# Patient Record
Sex: FEMALE | Race: BLACK OR AFRICAN AMERICAN | NOT HISPANIC OR LATINO | Employment: FULL TIME | URBAN - METROPOLITAN AREA
[De-identification: names, ages, dates, MRNs, and addresses within clinical notes are randomized per-mention and may not be internally consistent; named-entity substitution may affect disease eponyms.]

---

## 2018-03-15 ENCOUNTER — HISTORICAL (OUTPATIENT)
Dept: LAB | Facility: HOSPITAL | Age: 43
End: 2018-03-15

## 2021-11-09 ENCOUNTER — HISTORICAL (OUTPATIENT)
Dept: ADMINISTRATIVE | Facility: HOSPITAL | Age: 46
End: 2021-11-09

## 2021-11-09 LAB
BUN SERPL-MCNC: 39.5 MG/DL (ref 7–18.7)
CALCIUM SERPL-MCNC: 11.2 MG/DL (ref 8.7–10.5)
CHLORIDE SERPL-SCNC: 102 MMOL/L (ref 98–107)
CO2 SERPL-SCNC: 29 MMOL/L (ref 22–29)
CREAT SERPL-MCNC: 1.53 MG/DL (ref 0.55–1.02)
CREAT/UREA NIT SERPL: 26
GLUCOSE SERPL-MCNC: 102 MG/DL (ref 74–100)
POTASSIUM SERPL-SCNC: 4.6 MMOL/L (ref 3.5–5.1)
SODIUM SERPL-SCNC: 140 MMOL/L (ref 136–145)

## 2022-04-10 ENCOUNTER — HISTORICAL (OUTPATIENT)
Dept: ADMINISTRATIVE | Facility: HOSPITAL | Age: 47
End: 2022-04-10

## 2022-04-24 VITALS
HEIGHT: 59 IN | DIASTOLIC BLOOD PRESSURE: 90 MMHG | SYSTOLIC BLOOD PRESSURE: 150 MMHG | BODY MASS INDEX: 46.16 KG/M2 | WEIGHT: 229 LBS

## 2023-08-02 ENCOUNTER — HOSPITAL ENCOUNTER (EMERGENCY)
Facility: HOSPITAL | Age: 48
Discharge: HOME | End: 2023-08-02
Attending: STUDENT IN AN ORGANIZED HEALTH CARE EDUCATION/TRAINING PROGRAM

## 2023-08-02 VITALS
WEIGHT: 220 LBS | TEMPERATURE: 99 F | BODY MASS INDEX: 44.35 KG/M2 | HEART RATE: 78 BPM | OXYGEN SATURATION: 99 % | SYSTOLIC BLOOD PRESSURE: 178 MMHG | RESPIRATION RATE: 20 BRPM | HEIGHT: 59 IN | DIASTOLIC BLOOD PRESSURE: 92 MMHG

## 2023-08-02 DIAGNOSIS — S93.401A SPRAIN OF RIGHT ANKLE, UNSPECIFIED LIGAMENT, INITIAL ENCOUNTER: Primary | ICD-10-CM

## 2023-08-02 DIAGNOSIS — M25.579 ANKLE PAIN: ICD-10-CM

## 2023-08-02 PROCEDURE — 99283 EMERGENCY DEPT VISIT LOW MDM: CPT

## 2023-08-02 RX ORDER — HYDROCHLOROTHIAZIDE 25 MG/1
25 TABLET ORAL
COMMUNITY
Start: 2023-07-17

## 2023-08-02 RX ORDER — TRAMADOL HYDROCHLORIDE 50 MG/1
50 TABLET ORAL EVERY 6 HOURS PRN
Qty: 12 TABLET | Refills: 0 | Status: SHIPPED | OUTPATIENT
Start: 2023-08-02

## 2023-08-02 RX ORDER — CARVEDILOL 25 MG/1
25 TABLET ORAL 2 TIMES DAILY
COMMUNITY
Start: 2023-07-17

## 2023-08-02 RX ORDER — LOSARTAN POTASSIUM 50 MG/1
100 TABLET ORAL
COMMUNITY
Start: 2023-07-17

## 2023-08-02 NOTE — Clinical Note
"Estrella Wright" Tomas was seen and treated in our emergency department on 8/2/2023.  She may return to work on 08/05/2023.       If you have any questions or concerns, please don't hesitate to call.      Analy BALDERAS    "

## 2023-08-02 NOTE — ED TRIAGE NOTES
Pt complaint of pain to right inner ankle at getting out of a van and felt a twisting motion 8/2/23 at 10:30. Pt complaint of painful ambulation with some swelling to right ankle

## 2023-08-03 NOTE — DISCHARGE INSTRUCTIONS
Thanks for letting us take care of you today!  It is our goal to give you courteous care and to keep you comfortable and informed, if you have any questions before you leave I will be happy to try and answer them.    Here is some advice after your visit:      Your visit in the emergency department is NOT definitive care - please follow-up with your primary care doctor and/or specialist within 1 week.  Please return if you have any worsening in your condition or if you have any other concerns.    If you had radiology exams like an XRAY or CT in the emergency Department the interpreation on them may be preliminary - there may be less time sensitive findings on the reports please obtain these reports within 24 hours from the hospital or by using your out on your mobile phone to access records.  Bring these to your primary care doctor and/or specialist for further review of incidental findings.    You have been prescribed Tramadol. Please do not take this medication while working, drinking alcohol, swimming, or while driving/operating heavy machinery. This medication may cause drowsiness, dizziness, impair judgment, and reduce physical capabilities.You should not drive, operate heavy machinery, or make life changing decisions while taking this medication.

## 2023-08-03 NOTE — ED PROVIDER NOTES
Encounter Date: 8/2/2023       History     Chief Complaint   Patient presents with    Ankle Pain     Pt complaint of pain to right inner ankle at getting out of a van and felt a twisting motion 8/2/23 at 10:30. Pt complaint of painful ambulation with some swelling to right ankle     47 y.o.  female with a history of hypertension presents to Emergency Department with a chief complaint of R ankle pain. Symptoms began on today after stepping out of van and twisting her ankle and have been constant since onset. Associated symptoms include joint swelling. Symptoms are aggravated with exertion and there are no alleviating factors. The patient denies CP, SOB, fever, chills, or abdominal pain. No other reported symptoms at this time      The history is provided by the patient. No  was used.   Ankle Pain  This is a new problem. The current episode started 6 to 12 hours ago. The problem occurs constantly. The problem has not changed since onset.Pertinent negatives include no chest pain, no abdominal pain, no headaches and no shortness of breath. The symptoms are aggravated by exertion. She has tried nothing for the symptoms.     Review of patient's allergies indicates:  No Known Allergies  Past Medical History:   Diagnosis Date    HTN (hypertension)      History reviewed. No pertinent surgical history.  No family history on file.     Review of Systems   Constitutional:  Negative for chills, fatigue and fever.   Eyes:  Negative for photophobia and visual disturbance.   Respiratory:  Negative for cough, shortness of breath, wheezing and stridor.    Cardiovascular:  Negative for chest pain, palpitations and leg swelling.   Gastrointestinal:  Negative for abdominal pain, nausea and vomiting.   Musculoskeletal:  Positive for arthralgias and joint swelling. Negative for back pain and gait problem.   Skin:  Negative for color change and wound.   Neurological:  Negative for dizziness, syncope,  weakness and headaches.   All other systems reviewed and are negative.      Physical Exam     Initial Vitals [08/02/23 1740]   BP Pulse Resp Temp SpO2   (!) 187/100 70 18 98.6 °F (37 °C) 100 %      MAP       --         Physical Exam    Nursing note and vitals reviewed.  Constitutional: She appears well-developed and well-nourished. She is not diaphoretic. She is cooperative.  Non-toxic appearance. No distress.   HENT:   Head: Normocephalic and atraumatic.   Right Ear: External ear normal.   Left Ear: External ear normal.   Nose: Nose normal.   Mouth/Throat: Oropharynx is clear and moist.   Eyes: Conjunctivae and EOM are normal. Pupils are equal, round, and reactive to light.   Neck: Neck supple.   Normal range of motion.  Cardiovascular:  Normal rate, regular rhythm, S1 normal, S2 normal, normal heart sounds, intact distal pulses and normal pulses.           Pulmonary/Chest: Effort normal and breath sounds normal. No respiratory distress. She has no wheezes. She exhibits no tenderness.   Abdominal: Abdomen is soft. Bowel sounds are normal. She exhibits no distension. There is no abdominal tenderness. There is no guarding.   Musculoskeletal:         General: Tenderness present. Normal range of motion.      Cervical back: Normal range of motion and neck supple.      Right ankle: Swelling present. Tenderness present. No medial malleolus tenderness. Normal pulse.      Left ankle: Normal.      Comments: Tenderness and moderate swelling noted to R ankle. Full 5/5 ROM noted. CMS intact. All other adjacent joints otherwise normal.        Neurological: She is alert and oriented to person, place, and time. She has normal strength. No sensory deficit. GCS score is 15. GCS eye subscore is 4. GCS verbal subscore is 5. GCS motor subscore is 6.   Skin: Skin is warm and dry. Capillary refill takes less than 2 seconds. No rash noted. No erythema.   Psychiatric: She has a normal mood and affect. Thought content normal.         ED  Course   Procedures  Labs Reviewed - No data to display       Imaging Results              X-Ray Ankle Complete Right (Final result)  Result time 08/02/23 18:24:26      Final result by Addie Alexandre MD (08/02/23 18:24:26)                   Impression:      Degenerative change at the ankle.      Electronically signed by: Addie Alexandre  Date:    08/02/2023  Time:    18:24               Narrative:    EXAMINATION:  XR ANKLE COMPLETE 3 VIEW RIGHT    CLINICAL HISTORY:  Pain in unspecified ankle and joints of unspecified foot    TECHNIQUE:  AP, lateral, and oblique images of the right ankle were performed.    COMPARISON:  None.    FINDINGS:  No fracture. No dislocation. Ankle mortise is normal. Degenerative change at the ankle.  Plantar calcaneal enthesophyte.    Tissues nonfocal soft tissue swelling.                                       Medications - No data to display  Medical Decision Making:   Initial Assessment:   Patient awake, alert, has non-labored breathing, and follows commands appropriately. C/o R ankle pain and swelling that began on today after stepping out of van and twisting ankle. NAD noted.   Differential Diagnosis:   Ankle Sprain, Ankle Pain  Clinical Tests:   Radiological Study: Ordered and Reviewed  ED Management:  Co-morbidities and/or factors adding to the complexity or risk for the patient?: none  Differential diagnoses: Ankle Sprain, Ankle Pain  Decision to obtain previous or outside records?: I did not review records.   Chart Review (nursing home, outside records, CareEverywhere): none  Review of RX medications/new RX prescribed by me?: Prescribed short course of Tramadol. Cautioned on side effects.   Labs/imaging/other tests obtained/considered (risk/benefits of testing discussed): Xray R ankle.  Labs/tests intepretation:  Ankle- Degenerative change at the ankle. Informed patient of results.   My independent imaging interpretation: none  Treatment/interventions, IV fluids, IV  medications: Ace Wrap applied.   Complex management (IV controlled substances, went to OR, DNR, meds requiring monitoring, transfer, etc)?: none  Workup/treatment affected by social determinants of health?:none   Point of care US done/interpretation: none  Consults/radiologist/EMS/social work/family discussion/alternate history: none  Advanced care planning/end of life discussion: none  Shared decision making: Discussed plan of care and interventions with patient. Agreed to and aware of plan of care. Comfortable being discharged home.   ETOH/smoking/drug cessation discussion: none  Dispo: Patient discharged home. Patient denies new or additional complaints; no further tests indicated at this time. Verbalized understanding of instructions. No emergent or apparent distress noted prior to discharge. To follow up with PCP in 1 week as needed. Strict ER return precautions given.                               Clinical Impression:   Final diagnoses:  [S93.401A] Sprain of right ankle, unspecified ligament, initial encounter (Primary)        ED Disposition Condition    Discharge Stable          ED Prescriptions       Medication Sig Dispense Start Date End Date Auth. Provider    traMADoL (ULTRAM) 50 mg tablet Take 1 tablet (50 mg total) by mouth every 6 (six) hours as needed for Pain. 12 tablet 8/2/2023 -- Meliza Paz NP          Follow-up Information       Follow up With Specialties Details Why Contact Info    Eliza Ortega MD Internal Medicine Call in 1 week If symptoms worsen, As needed [address]  [facility] [address]  [phone]      [facility] - Emergency Dept Emergency Medicine Go to  If symptoms worsen, As needed [address]  [facility] [id number] [phone]             Meliza Paz NP  08/02/23 4341

## 2024-07-03 ENCOUNTER — HOSPITAL ENCOUNTER (EMERGENCY)
Facility: HOSPITAL | Age: 49
Discharge: HOME | End: 2024-07-03
Attending: EMERGENCY MEDICINE
Payer: COMMERCIAL

## 2024-07-03 VITALS
OXYGEN SATURATION: 98 % | HEIGHT: 59 IN | BODY MASS INDEX: 47.98 KG/M2 | TEMPERATURE: 98 F | DIASTOLIC BLOOD PRESSURE: 79 MMHG | RESPIRATION RATE: 18 BRPM | SYSTOLIC BLOOD PRESSURE: 165 MMHG | HEART RATE: 61 BPM | WEIGHT: 238 LBS

## 2024-07-03 DIAGNOSIS — T16.2XXA FOREIGN BODY OF LEFT EAR, INITIAL ENCOUNTER: Primary | ICD-10-CM

## 2024-07-03 PROCEDURE — 99282 EMERGENCY DEPT VISIT SF MDM: CPT | Mod: 25

## 2024-07-03 PROCEDURE — 69200 CLEAR OUTER EAR CANAL: CPT | Mod: LT

## 2024-07-04 NOTE — ED PROVIDER NOTES
Encounter Date: 7/3/2024       History     Chief Complaint   Patient presents with    Foreign Body in Ear     Stuck a piece of cotton in L ear 3 days ago and can't get it out     48yoAAF with left ear foreign body- q tip. Unable to remove.     The history is provided by the patient and the spouse. No  was used.     Review of patient's allergies indicates:  No Known Allergies  Past Medical History:   Diagnosis Date    HTN (hypertension)      No past surgical history on file.  No family history on file.     Review of Systems   Constitutional: Negative.    HENT:  Positive for ear pain (foreign body LEFT ear).    Eyes: Negative.    Respiratory: Negative.     Cardiovascular: Negative.    Gastrointestinal: Negative.    Genitourinary: Negative.    Musculoskeletal: Negative.    Neurological: Negative.        Physical Exam     Initial Vitals [07/03/24 1823]   BP Pulse Resp Temp SpO2   (!) 165/79 61 18 98.4 °F (36.9 °C) 98 %      MAP       --         Physical Exam    Vitals reviewed.  Constitutional: She appears well-developed and well-nourished. No distress.   HENT:   Head: Normocephalic and atraumatic.   Right Ear: No foreign bodies.   Left Ear: A foreign body is present.   Eyes: Conjunctivae, EOM and lids are normal. Pupils are equal, round, and reactive to light.   Neck: Neck supple.   Normal range of motion.  Cardiovascular:  Normal rate and regular rhythm.           Pulmonary/Chest: Effort normal.   Abdominal: Abdomen is flat.   Musculoskeletal:      Cervical back: Normal range of motion and neck supple.     Neurological: She is alert and oriented to person, place, and time. She has normal strength. She is not disoriented. No cranial nerve deficit or sensory deficit. GCS eye subscore is 4. GCS verbal subscore is 5. GCS motor subscore is 6.   Skin: Skin is warm and intact.   Psychiatric: She has a normal mood and affect. Her speech is normal and behavior is normal. Judgment and thought content normal.  Cognition and memory are normal.         ED Course   Foreign Body    Date/Time: 7/3/2024 7:35 PM    Performed by: Ken Keita MD  Authorized by: Ken Keita MD  Body area: ear  Location details: left ear  Localization method: ENT speculum and visualized  Removal mechanism: alligator forceps and irrigation  Post-procedure assessment: foreign body removed      Labs Reviewed - No data to display       Imaging Results    None          Medications - No data to display  Medical Decision Making  Successful removal with alligator forceps. TM intact.     Problems Addressed:  Foreign body of left ear, initial encounter: acute illness or injury    Risk  OTC drugs.                                      Clinical Impression:  Final diagnoses:  [T16.2XXA] Foreign body of left ear, initial encounter (Primary)          ED Disposition Condition    Discharge Stable          ED Prescriptions    None       Follow-up Information       Follow up With Specialties Details Why Contact Info    Eliza Ortega MD Internal Medicine  If symptoms worsen, As needed [address]  [facility] [address]  [phone]      [facility] - Emergency Dept Emergency Medicine  As needed, If symptoms worsen [address]  [facility] [id number] [phone]             Rebecca Perez PA  07/03/24 2044

## 2025-03-08 ENCOUNTER — HOSPITAL ENCOUNTER (EMERGENCY)
Facility: HOSPITAL | Age: 50
Discharge: HOME | End: 2025-03-08
Attending: STUDENT IN AN ORGANIZED HEALTH CARE EDUCATION/TRAINING PROGRAM
Payer: COMMERCIAL

## 2025-03-08 VITALS
TEMPERATURE: 97 F | BODY MASS INDEX: 57.45 KG/M2 | SYSTOLIC BLOOD PRESSURE: 150 MMHG | WEIGHT: 285 LBS | HEIGHT: 59 IN | DIASTOLIC BLOOD PRESSURE: 72 MMHG | HEART RATE: 67 BPM | OXYGEN SATURATION: 96 % | RESPIRATION RATE: 18 BRPM
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
## 2025-03-08 DIAGNOSIS — S16.1XXA CERVICAL STRAIN, ACUTE, INITIAL ENCOUNTER: ICD-10-CM

## 2025-03-08 PROCEDURE — 99284 EMERGENCY DEPT VISIT MOD MDM: CPT | Mod: 25

## 2025-03-08 PROCEDURE — 25000003 PHARM REV CODE 250

## 2025-03-08 RX ORDER — METHOCARBAMOL 500 MG/1
500 TABLET, FILM COATED ORAL 3 TIMES DAILY
Qty: 15 TABLET | Refills: 0 | Status: SHIPPED | OUTPATIENT
Start: 2025-03-08 | End: 2025-03-13

## 2025-03-08 RX ORDER — ACETAMINOPHEN 500 MG
1000 TABLET ORAL
Status: COMPLETED | OUTPATIENT
Start: 2025-03-08 | End: 2025-03-08

## 2025-03-08 RX ORDER — CARVEDILOL 25 MG/1
25 TABLET ORAL 2 TIMES DAILY WITH MEALS
Status: DISCONTINUED | OUTPATIENT
Start: 2025-03-08 | End: 2025-03-09 | Stop reason: HOSPADM

## 2025-03-08 RX ORDER — LOSARTAN POTASSIUM 50 MG/1
50 TABLET ORAL
Status: COMPLETED | OUTPATIENT
Start: 2025-03-08 | End: 2025-03-08

## 2025-03-08 RX ORDER — HYDROCHLOROTHIAZIDE 25 MG/1
25 TABLET ORAL
Status: COMPLETED | OUTPATIENT
Start: 2025-03-08 | End: 2025-03-08

## 2025-03-08 RX ADMIN — CARVEDILOL 25 MG: 25 TABLET, FILM COATED ORAL at 08:03

## 2025-03-08 RX ADMIN — LOSARTAN POTASSIUM 50 MG: 50 TABLET, FILM COATED ORAL at 08:03

## 2025-03-08 RX ADMIN — ACETAMINOPHEN 1000 MG: 500 TABLET ORAL at 08:03

## 2025-03-08 RX ADMIN — HYDROCHLOROTHIAZIDE 25 MG: 25 TABLET ORAL at 08:03
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
## 2025-03-20 ENCOUNTER — HOSPITAL ENCOUNTER (EMERGENCY)
Facility: HOSPITAL | Age: 50
Discharge: HOME | End: 2025-03-20
Attending: EMERGENCY MEDICINE
Payer: COMMERCIAL

## 2025-03-20 VITALS
OXYGEN SATURATION: 95 % | HEART RATE: 66 BPM | RESPIRATION RATE: 18 BRPM | BODY MASS INDEX: 55.44 KG/M2 | HEIGHT: 59 IN | SYSTOLIC BLOOD PRESSURE: 184 MMHG | TEMPERATURE: 99 F | WEIGHT: 275 LBS | DIASTOLIC BLOOD PRESSURE: 96 MMHG

## 2025-03-20 DIAGNOSIS — S90.01XA CONTUSION OF RIGHT ANKLE, INITIAL ENCOUNTER: Primary | ICD-10-CM

## 2025-03-20 DIAGNOSIS — R52 PAIN: ICD-10-CM

## 2025-03-20 PROCEDURE — 99283 EMERGENCY DEPT VISIT LOW MDM: CPT | Mod: 25

## 2025-03-20 RX ORDER — ROSUVASTATIN CALCIUM 20 MG/1
TABLET, COATED ORAL
COMMUNITY

## 2025-03-20 RX ORDER — FUROSEMIDE 20 MG/1
20 TABLET ORAL
COMMUNITY
Start: 2024-12-05

## 2025-03-20 RX ORDER — ASPIRIN 81 MG/1
1 TABLET ORAL DAILY
COMMUNITY

## 2025-03-20 RX ORDER — DICLOFENAC SODIUM 75 MG/1
75 TABLET, DELAYED RELEASE ORAL 2 TIMES DAILY
Qty: 28 TABLET | Refills: 0 | Status: SHIPPED | OUTPATIENT
Start: 2025-03-20 | End: 2025-04-03

## 2025-03-21 NOTE — ED PROVIDER NOTES
Encounter Date: 3/20/2025       History     Chief Complaint   Patient presents with    Ankle Pain     C/o right ankle pain. Was hit posterior ankle at work.      The history is provided by the patient.   Ankle Pain  This is a new problem. The current episode started 3 to 5 hours ago. The problem occurs constantly. The problem has not changed since onset.Pertinent negatives include no chest pain and no shortness of breath. The symptoms are aggravated by walking and standing. Nothing relieves the symptoms.   Works at [employer] and co-worker inadvertently struck her right heel area with a floor kamille.    Review of patient's allergies indicates:  No Known Allergies  Past Medical History:   Diagnosis Date    HTN (hypertension)      History reviewed. No pertinent surgical history.  No family history on file.  Social History[1]  Review of Systems   Constitutional:  Negative for fever.   HENT:  Negative for sore throat.    Respiratory:  Negative for shortness of breath.    Cardiovascular:  Negative for chest pain.   Gastrointestinal:  Negative for nausea.   Genitourinary:  Negative for dysuria.   Musculoskeletal:  Negative for back pain.   Skin:  Negative for rash.   Neurological:  Negative for weakness.   Hematological:  Does not bruise/bleed easily.       Physical Exam     Initial Vitals [03/20/25 1900]   BP Pulse Resp Temp SpO2   (!) 234/110 69 16 98.8 °F (37.1 °C) 95 %      MAP       --         Physical Exam    Nursing note and vitals reviewed.  Constitutional: She appears well-developed and well-nourished.   HENT:   Head: Normocephalic and atraumatic.   Right Ear: External ear normal.   Left Ear: External ear normal.   Eyes: Conjunctivae and EOM are normal. Pupils are equal, round, and reactive to light.   Neck: Neck supple.   Normal range of motion.  Cardiovascular:  Normal rate, regular rhythm, normal heart sounds and intact distal pulses.           Pulmonary/Chest: Breath sounds normal.   Abdominal: Abdomen is soft.  Bowel sounds are normal.   Morbidly obese   Musculoskeletal:         General: Normal range of motion.      Cervical back: Normal range of motion and neck supple.        Feet:       Comments: Negative Gonzalez test     Neurological: She is alert and oriented to person, place, and time. GCS score is 15. GCS eye subscore is 4. GCS verbal subscore is 5. GCS motor subscore is 6.   Skin: Skin is warm and dry. Capillary refill takes less than 2 seconds.   Psychiatric: She has a normal mood and affect. Her behavior is normal. Judgment and thought content normal.         ED Course   Procedures  Labs Reviewed - No data to display       Imaging Results              X-Ray Ankle Complete Right (Preliminary result)  Result time 03/20/25 19:43:11      Wet Read by Panda Smith MD (03/20/25 19:43:11, [facility] - Emergency Dept, Emergency Medicine)    Degenerative changes, no fracture or dislocation                                     Medications - No data to display  Medical Decision Making  Amount and/or Complexity of Data Reviewed  Radiology: ordered and independent interpretation performed. Decision-making details documented in ED Course.    Risk  Prescription drug management.    Differential includes:  sprain, strain, contusion, fracture.  Will obtain ankle x-rays.                                  Clinical Impression:  Final diagnoses:  [R52] Pain  [S90.01XA] Contusion of right ankle, initial encounter (Primary)          ED Disposition Condition    Discharge Stable          ED Prescriptions       Medication Sig Dispense Start Date End Date Auth. Provider    diclofenac (VOLTAREN) 75 MG EC tablet Take 1 tablet (75 mg total) by mouth 2 (two) times daily. for 14 days 28 tablet 3/20/2025 4/3/2025 Panda Smith MD          Follow-up Information       Follow up With Specialties Details Why Contact Info    Eliza Ortega MD Internal Medicine Schedule an appointment as soon as possible for  a visit   [address] [place], [place] [address].  [facility] [id number]  [phone]                 [1]   Social History  Tobacco Use    Smoking status: Never    Smokeless tobacco: Never   Substance Use Topics    Alcohol use: Never    Drug use: Never        Panda Smith MD  03/20/25 1950

## 2025-05-20 ENCOUNTER — HOSPITAL ENCOUNTER (EMERGENCY)
Facility: HOSPITAL | Age: 50
Discharge: HOME | End: 2025-05-20
Attending: EMERGENCY MEDICINE
Payer: COMMERCIAL

## 2025-05-20 VITALS
OXYGEN SATURATION: 98 % | DIASTOLIC BLOOD PRESSURE: 78 MMHG | RESPIRATION RATE: 16 BRPM | TEMPERATURE: 98 F | BODY MASS INDEX: 57.45 KG/M2 | SYSTOLIC BLOOD PRESSURE: 179 MMHG | HEART RATE: 76 BPM | HEIGHT: 59 IN | WEIGHT: 285 LBS

## 2025-05-20 DIAGNOSIS — M25.571 RIGHT ANKLE PAIN, UNSPECIFIED CHRONICITY: Primary | ICD-10-CM

## 2025-05-20 DIAGNOSIS — S99.911D INJURY OF RIGHT ANKLE, SUBSEQUENT ENCOUNTER: ICD-10-CM

## 2025-05-20 PROCEDURE — 99283 EMERGENCY DEPT VISIT LOW MDM: CPT

## 2025-05-20 PROCEDURE — 25000003 PHARM REV CODE 250

## 2025-05-20 RX ORDER — CLONIDINE HYDROCHLORIDE 0.1 MG/1
TABLET ORAL
Status: DISPENSED
Start: 2025-05-20 | End: 2025-05-21

## 2025-05-20 RX ORDER — CARVEDILOL 25 MG/1
25 TABLET ORAL
COMMUNITY

## 2025-05-20 RX ORDER — ONDANSETRON 4 MG/1
TABLET, ORALLY DISINTEGRATING ORAL
Status: DISPENSED
Start: 2025-05-20 | End: 2025-05-21

## 2025-05-20 RX ORDER — HYDROCODONE BITARTRATE AND ACETAMINOPHEN 5; 325 MG/1; MG/1
1 TABLET ORAL
Refills: 0 | Status: COMPLETED | OUTPATIENT
Start: 2025-05-20 | End: 2025-05-20

## 2025-05-20 RX ORDER — TRAMADOL HYDROCHLORIDE 50 MG/1
50 TABLET, FILM COATED ORAL EVERY 8 HOURS PRN
Qty: 10 TABLET | Refills: 0 | Status: SHIPPED | OUTPATIENT
Start: 2025-05-20

## 2025-05-20 RX ORDER — DULOXETIN HYDROCHLORIDE 60 MG/1
1 CAPSULE, DELAYED RELEASE ORAL EVERY MORNING
COMMUNITY

## 2025-05-20 RX ORDER — ONDANSETRON 4 MG/1
4 TABLET, ORALLY DISINTEGRATING ORAL
Status: COMPLETED | OUTPATIENT
Start: 2025-05-20 | End: 2025-05-20

## 2025-05-20 RX ORDER — HYDROCODONE BITARTRATE AND ACETAMINOPHEN 5; 325 MG/1; MG/1
TABLET ORAL
Status: DISPENSED
Start: 2025-05-20 | End: 2025-05-21

## 2025-05-20 RX ORDER — CLONIDINE HYDROCHLORIDE 0.1 MG/1
0.1 TABLET ORAL
Status: COMPLETED | OUTPATIENT
Start: 2025-05-20 | End: 2025-05-20

## 2025-05-20 RX ADMIN — ONDANSETRON 4 MG: 4 TABLET, ORALLY DISINTEGRATING ORAL at 09:05

## 2025-05-20 RX ADMIN — HYDROCODONE BITARTRATE AND ACETAMINOPHEN 1 TABLET: 5; 325 TABLET ORAL at 09:05

## 2025-05-20 RX ADMIN — CLONIDINE HYDROCHLORIDE 0.1 MG: 0.1 TABLET ORAL at 09:05

## 2025-05-20 NOTE — Clinical Note
"Estrella Wright" Tomas was seen and treated in our emergency department on 5/20/2025.  She may return to work on 05/23/2025.       If you have any questions or concerns, please don't hesitate to call.      Victorina Kiran PA-C"

## 2025-05-21 NOTE — DISCHARGE INSTRUCTIONS
Take over-the-counter ibuprofen or Tylenol as needed for pain.  Elevate your foot and use ice to help with the pain.  See attached instructions for further information.    See your family doctor in one to 2 days for further evaluation, workup, and treatment as necessary    Avoid driving or operating machinery while taking medicines as some medicines might cause drowsiness and may cause problems. Also pain medicines have potential of being addictive  so use Pain meds specially Narcotics Sparingly.    The exam and treatment you received in Emergency Room was for an urgent problem and NOT INTENDED AS COMPLETE CARE. It is important that you FOLLOW UP with a doctor for ongoing care. If your symptoms become WORSE or you DO NOT IMPROVE and you are unable to reach your health care provider, you should RETURN to the emergency department. The Emergency Room doctor has provided a PRELIMINARY INTERPRETATION of all your STUDIES. A final interpretation may be done after you are discharged. IF A CHANGE in your diagnosis or treatment is needed WE WILL CONTACT YOU. It is critical that we have a CURRENT PHONE NUMBER FOR YOU.

## 2025-05-21 NOTE — ED PROVIDER NOTES
Encounter Date: 5/20/2025       History     Chief Complaint   Patient presents with    Ankle Injury     Pt having chronic r ankle pain s/p being hit by a forklift 2 months ago.     See MDM    The history is provided by the patient. No  was used.     Review of patient's allergies indicates:  No Known Allergies  Past Medical History:   Diagnosis Date    High cholesterol     HTN (hypertension)      No past surgical history on file.  No family history on file.  Social History[1]  Review of Systems   Constitutional:         Right ankle pain   All other systems reviewed and are negative.      Physical Exam     Initial Vitals [05/20/25 2045]   BP Pulse Resp Temp SpO2   (!) 200/102 71 18 98 °F (36.7 °C) 96 %      MAP       --         Physical Exam    Nursing note and vitals reviewed.  Constitutional: She appears well-developed and well-nourished. She is not diaphoretic. She is Obese . No distress.   Eyes: EOM are normal.   Neck:   Normal range of motion.  Cardiovascular:  Normal rate, regular rhythm and intact distal pulses.           Pulmonary/Chest: Breath sounds normal.   Musculoskeletal:         General: Tenderness present. Normal range of motion.      Cervical back: Normal range of motion.        Feet:       Comments: Patient endorsing TTP to the right posterior ankle as well as bilateral sides.  ROM WNL.  No obvious bruising, erythema, wounds, or other overlying skin changes noted.  Bilateral ankles are noted to be swollen, they are of equal size.  Intact sensation.  Intact pulses.  Less than 2 capillary refill.  Patient is ambulatory. All other adjacent joints otherwise normal     Neurological: She is alert and oriented to person, place, and time. She has normal strength. She displays normal reflexes. No sensory deficit.   Skin: Skin is warm and dry. Capillary refill takes less than 2 seconds.   Psychiatric: She has a normal mood and affect.         ED Course   Procedures  Labs Reviewed - No data  to display       Imaging Results    None          Medications   cloNIDine tablet 0.1 mg (0.1 mg Oral Given 5/20/25 2103)   HYDROcodone-acetaminophen 5-325 mg per tablet 1 tablet (1 tablet Oral Given 5/20/25 2118)   ondansetron disintegrating tablet 4 mg (4 mg Oral Given 5/20/25 2118)     Medical Decision Making  The patient is a 49 y.o. female with a pertinent PMHX of for HTN, HLD, right ankle injury 2 months ago who presents to the Emergency Department with her fiance with a chief complaint of right ankle pain. Symptoms began on 03/20/2025 following an injury at work where 1 of the coworkers inadvertently struck her right heel with a 4 Matt and have been constant since onset. Associated symptoms include nothing. The pain is currently rated as a 10/10 in severity and described as painful with no radiation.  Symptoms are aggravated with standing for prolonged periods of time and alleviated with nothing. The patient denies new trauma, numbness, tingling, erythema, bruises, wounds, changes or worsening of the pain, chest pain, shortness of breath, pain in the calves, inability to ambulate, bleeding disorders, kidney disorders. They report taking ibuprofen, Aleve prior to arrival with no relief of symptoms. No other reported symptoms at this time.  Patient states that she has been unable to get in to see an orthopedic doctor as they are waiting on a claim number from the injury.  Patient states that the reason she came to the ER today is because she was on her feet all day at work and felt like the medications that she was taken was not relieving her symptoms.  She is requesting a few days off of work as well.    Pertinent physical exam findings include Patient endorsing TTP to the right posterior ankle as well as bilateral sides.  ROM WNL.  No obvious bruising, erythema, wounds, or other overlying skin changes noted.  Bilateral ankles are noted to be swollen, they are of equal size.  Intact sensation.  Intact pulses.   "Less than 2 capillary refill.  Patient is ambulatory. All other adjacent joints otherwise normal.  Vital signs on arrival notable for HTN, otherwise stable.  Patient states that she took her blood pressure medications today.  She was noted to be 234/110 blood pressure at her previous ER visit.  Clonidine given.  Medication for pain given.    Discussed repeating XR imaging with the patient.  She states that she does not feel this is necessary at this time as she has had no new injuries or changes in the pain.  She will follow up with her primary care provider.  Patient endorsing improvement in symptoms since being given medication while ER.  Vital signs improved prior to discharge.  Medications sent to pharmacy  Discharge instructions and return precautions provided. Patient verbalized understanding and agreement of plan. All questions answered.    Portions of this note have been created with voice recognition software. Occasional "wrong-words" or "sound alike" substitutions may have occurred due to inherent limitations of voice software. Please read the note carefully and recognize, using context, word substitutions may have occurred.       Amount and/or Complexity of Data Reviewed  External Data Reviewed: radiology and notes.     Details: Previous ER visit on 03/20/2025 reviewed.  XR at that time with no acute fracture or dislocation.  There were some degenerative changes noted.    Risk  Prescription drug management.  Risk Details: Strict ED return precautions provided. I have spoken with the patient and/or caregivers. I have explained the patient's condition, diagnoses and treatment plan based on the information available to me at this time. I have answered the patient's and/or caregiver's questions and addressed any concerns. The patient and/or caregivers have as good an understanding of the patient's diagnosis, condition and treatment plan as can be expected at this point. The patient's condition is stable and " appropriate for discharge from the emergency department.      The patient will pursue further outpatient evaluation with the primary care physician or other designated or consulting physician as outlined in the discharge instructions. The patient and/or caregivers are agreeable to this plan of care and follow-up instructions have been explained in detail. The patient and/or caregivers have received these instructions in written format and have expressed an understanding of the discharge instructions. The patient and/or caregivers are aware that any significant change in condition or worsening of symptoms should prompt an immediate return to this or the closest emergency department or a call to 911.        Additional MDM:   Differential Diagnosis:   Judging by the patient's chief complaint and pertinent history, the patient has the following possible differential diagnoses, including but not limited to the following:  Sprain, strain, contusion, abrasion, chronic pain  Some of these are deemed to be lower likelihood and some more likely based on my physical exam and history combined with possible lab work and/or imaging studies. Please see the pertinent studies, and refer to the HPI. Some of these diagnoses will take further evaluation to fully rule out, perhaps as an outpatient and the patient was encouraged to follow up when discharged for more comprehensive evaluation.                                       Clinical Impression:  Final diagnoses:  [M25.571] Right ankle pain, unspecified chronicity (Primary)  [S99.911D] Injury of right ankle, subsequent encounter          ED Disposition Condition    Discharge Stable          ED Prescriptions       Medication Sig Dispense Start Date End Date Auth. Provider    traMADoL (ULTRAM) 50 mg tablet Take 1 tablet (50 mg total) by mouth every 8 (eight) hours as needed for Pain. 10 tablet 5/20/2025 -- Victorina Kiran PA-C          Follow-up Information       Follow up With  Specialties Details Why Contact Info    Orthopedic doctor  Call in 1 week For follow up     Eliza Ortega MD Internal Medicine Call in 1 day For follow up [address]  [place] [address]  [phone]                     [1]   Social History  Tobacco Use    Smoking status: Never    Smokeless tobacco: Never   Substance Use Topics    Alcohol use: Never    Drug use: Never        Victorina Kiran PA-C  05/20/25 7734

## 2025-06-25 ENCOUNTER — HOSPITAL ENCOUNTER (EMERGENCY)
Facility: HOSPITAL | Age: 50
Discharge: HOME | End: 2025-06-25
Attending: EMERGENCY MEDICINE
Payer: COMMERCIAL

## 2025-06-25 VITALS
HEIGHT: 59 IN | DIASTOLIC BLOOD PRESSURE: 75 MMHG | WEIGHT: 275 LBS | HEART RATE: 65 BPM | OXYGEN SATURATION: 97 % | BODY MASS INDEX: 55.44 KG/M2 | TEMPERATURE: 98 F | SYSTOLIC BLOOD PRESSURE: 155 MMHG | RESPIRATION RATE: 18 BRPM

## 2025-06-25 DIAGNOSIS — R11.0 NAUSEA: ICD-10-CM

## 2025-06-25 DIAGNOSIS — B37.2 CUTANEOUS CANDIDIASIS: ICD-10-CM

## 2025-06-25 DIAGNOSIS — R10.84 DIFFUSE ABDOMINAL PAIN: Primary | ICD-10-CM

## 2025-06-25 LAB
ALBUMIN SERPL-MCNC: 4.5 G/DL (ref 3.5–5)
ALBUMIN/GLOB SERPL: 1.2 RATIO (ref 1.1–2)
ALP SERPL-CCNC: 89 UNIT/L (ref 40–150)
ALT SERPL-CCNC: 28 UNIT/L (ref 0–55)
ANION GAP SERPL CALC-SCNC: 7 MEQ/L
AST SERPL-CCNC: 23 UNIT/L (ref 11–45)
BACTERIA #/AREA URNS AUTO: ABNORMAL /HPF
BASOPHILS # BLD AUTO: 0.02 X10(3)/MCL
BASOPHILS NFR BLD AUTO: 0.2 %
BILIRUB SERPL-MCNC: 0.4 MG/DL
BILIRUB UR QL STRIP.AUTO: NEGATIVE
BUN SERPL-MCNC: 9.5 MG/DL (ref 9.8–20.1)
CALCIUM SERPL-MCNC: 10.5 MG/DL (ref 8.4–10.2)
CHLORIDE SERPL-SCNC: 112 MMOL/L (ref 98–107)
CLARITY UR: CLEAR
CO2 SERPL-SCNC: 23 MMOL/L (ref 22–29)
COLOR UR AUTO: ABNORMAL
CREAT SERPL-MCNC: 0.71 MG/DL (ref 0.55–1.02)
CREAT/UREA NIT SERPL: 13
EOSINOPHIL # BLD AUTO: 0.07 X10(3)/MCL (ref 0–0.9)
EOSINOPHIL NFR BLD AUTO: 0.9 %
ERYTHROCYTE [DISTWIDTH] IN BLOOD BY AUTOMATED COUNT: 13.7 % (ref 11.5–17)
GFR SERPLBLD CREATININE-BSD FMLA CKD-EPI: >60 ML/MIN/1.73/M2
GLOBULIN SER-MCNC: 3.9 GM/DL (ref 2.4–3.5)
GLUCOSE SERPL-MCNC: 88 MG/DL (ref 74–100)
GLUCOSE UR QL STRIP: NEGATIVE
HCT VFR BLD AUTO: 49.4 % (ref 37–47)
HGB BLD-MCNC: 16.2 G/DL (ref 12–16)
HGB UR QL STRIP: ABNORMAL
IMM GRANULOCYTES # BLD AUTO: 0.04 X10(3)/MCL (ref 0–0.04)
IMM GRANULOCYTES NFR BLD AUTO: 0.5 %
KETONES UR QL STRIP: NEGATIVE
LEUKOCYTE ESTERASE UR QL STRIP: NEGATIVE
LIPASE SERPL-CCNC: 38 U/L
LYMPHOCYTES # BLD AUTO: 2.12 X10(3)/MCL (ref 0.6–4.6)
LYMPHOCYTES NFR BLD AUTO: 26.3 %
MCH RBC QN AUTO: 29.4 PG (ref 27–31)
MCHC RBC AUTO-ENTMCNC: 32.8 G/DL (ref 33–36)
MCV RBC AUTO: 89.7 FL (ref 80–94)
MONOCYTES # BLD AUTO: 0.65 X10(3)/MCL (ref 0.1–1.3)
MONOCYTES NFR BLD AUTO: 8.1 %
MUCOUS THREADS URNS QL MICRO: ABNORMAL /LPF
NEUTROPHILS # BLD AUTO: 5.17 X10(3)/MCL (ref 2.1–9.2)
NEUTROPHILS NFR BLD AUTO: 64 %
NITRITE UR QL STRIP: NEGATIVE
NRBC BLD AUTO-RTO: 0 %
PH UR STRIP: 6 [PH]
PLATELET # BLD AUTO: 193 X10(3)/MCL (ref 130–400)
PMV BLD AUTO: 12.2 FL (ref 7.4–10.4)
POTASSIUM SERPL-SCNC: 4.1 MMOL/L (ref 3.5–5.1)
PROT SERPL-MCNC: 8.4 GM/DL (ref 6.4–8.3)
PROT UR QL STRIP: 100
RBC # BLD AUTO: 5.51 X10(6)/MCL (ref 4.2–5.4)
RBC #/AREA URNS AUTO: ABNORMAL /HPF
SODIUM SERPL-SCNC: 142 MMOL/L (ref 136–145)
SP GR UR STRIP.AUTO: >=1.03 (ref 1–1.03)
SQUAMOUS #/AREA URNS AUTO: ABNORMAL /HPF
TROPONIN I SERPL-MCNC: <0.01 NG/ML (ref 0–0.04)
UROBILINOGEN UR STRIP-ACNC: 0.2
WBC # BLD AUTO: 8.07 X10(3)/MCL (ref 4.5–11.5)
WBC #/AREA URNS AUTO: ABNORMAL /HPF

## 2025-06-25 PROCEDURE — 81003 URINALYSIS AUTO W/O SCOPE: CPT | Performed by: EMERGENCY MEDICINE

## 2025-06-25 PROCEDURE — 96374 THER/PROPH/DIAG INJ IV PUSH: CPT

## 2025-06-25 PROCEDURE — 83690 ASSAY OF LIPASE: CPT | Performed by: EMERGENCY MEDICINE

## 2025-06-25 PROCEDURE — 25500020 PHARM REV CODE 255: Performed by: EMERGENCY MEDICINE

## 2025-06-25 PROCEDURE — 99285 EMERGENCY DEPT VISIT HI MDM: CPT | Mod: 25

## 2025-06-25 PROCEDURE — 80053 COMPREHEN METABOLIC PANEL: CPT | Performed by: EMERGENCY MEDICINE

## 2025-06-25 PROCEDURE — 84484 ASSAY OF TROPONIN QUANT: CPT | Performed by: EMERGENCY MEDICINE

## 2025-06-25 PROCEDURE — 96361 HYDRATE IV INFUSION ADD-ON: CPT

## 2025-06-25 PROCEDURE — 63600175 PHARM REV CODE 636 W HCPCS: Performed by: EMERGENCY MEDICINE

## 2025-06-25 PROCEDURE — 85025 COMPLETE CBC W/AUTO DIFF WBC: CPT | Performed by: EMERGENCY MEDICINE

## 2025-06-25 RX ORDER — LABETALOL HYDROCHLORIDE 5 MG/ML
20 INJECTION, SOLUTION INTRAVENOUS
Status: DISCONTINUED | OUTPATIENT
Start: 2025-06-25 | End: 2025-06-25 | Stop reason: HOSPADM

## 2025-06-25 RX ORDER — PANTOPRAZOLE SODIUM 40 MG/1
40 TABLET, DELAYED RELEASE ORAL DAILY
Qty: 30 TABLET | Refills: 2 | Status: SHIPPED | OUTPATIENT
Start: 2025-06-25 | End: 2025-09-23

## 2025-06-25 RX ORDER — KETOROLAC TROMETHAMINE 30 MG/ML
30 INJECTION, SOLUTION INTRAMUSCULAR; INTRAVENOUS
Status: COMPLETED | OUTPATIENT
Start: 2025-06-25 | End: 2025-06-25

## 2025-06-25 RX ORDER — DICYCLOMINE HYDROCHLORIDE 20 MG/1
20 TABLET ORAL 4 TIMES DAILY
Qty: 40 TABLET | Refills: 0 | Status: SHIPPED | OUTPATIENT
Start: 2025-06-25 | End: 2025-07-05

## 2025-06-25 RX ORDER — NYSTATIN 100000 [USP'U]/G
POWDER TOPICAL 2 TIMES DAILY
Qty: 60 G | Refills: 3 | Status: SHIPPED | OUTPATIENT
Start: 2025-06-25

## 2025-06-25 RX ADMIN — IOHEXOL 100 ML: 350 INJECTION, SOLUTION INTRAVENOUS at 12:06

## 2025-06-25 RX ADMIN — KETOROLAC TROMETHAMINE 30 MG: 30 INJECTION, SOLUTION INTRAMUSCULAR; INTRAVENOUS at 12:06

## 2025-06-25 RX ADMIN — SODIUM CHLORIDE, POTASSIUM CHLORIDE, SODIUM LACTATE AND CALCIUM CHLORIDE 1000 ML: 600; 310; 30; 20 INJECTION, SOLUTION INTRAVENOUS at 12:06

## 2025-06-25 NOTE — ED PROVIDER NOTES
Encounter Date: 6/25/2025       History     Chief Complaint   Patient presents with    Weakness     Pt c/o feeling weak onset six days ago, along with abd pain n/v/d.     The history is provided by the patient.   Abdominal Pain  The current episode started several days ago. The abdominal pain is generalized. The abdominal pain is relieved by nothing. The other symptoms of the illness include diarrhea. The other symptoms of the illness do not include fever, shortness of breath, nausea, vomiting or dysuria.   The diarrhea began 3 to 5 days ago. The diarrhea is watery.   Symptoms associated with the illness do not include back pain.   Also complains of generalized weakness.  Symptoms began one week ago but improved over the weekend, but she began feeling bad again 2 days ago.  Did not take her BP medication this AM.    Review of patient's allergies indicates:  No Known Allergies  Past Medical History:   Diagnosis Date    High cholesterol     HTN (hypertension)      No past surgical history on file.  No family history on file.  Social History[1]  Review of Systems   Constitutional:  Negative for fever.   HENT:  Negative for sore throat.    Respiratory:  Negative for shortness of breath.    Cardiovascular:  Negative for chest pain.   Gastrointestinal:  Positive for abdominal pain and diarrhea. Negative for nausea and vomiting.   Genitourinary:  Negative for dysuria.   Musculoskeletal:  Negative for back pain.   Skin:  Negative for rash.   Neurological:  Negative for weakness.   Hematological:  Does not bruise/bleed easily.       Physical Exam     Initial Vitals [06/25/25 1153]   BP Pulse Resp Temp SpO2   (!) 209/110 71 20 98.4 °F (36.9 °C) 98 %      MAP       --         Physical Exam    Nursing note and vitals reviewed.  Constitutional: She appears well-developed and well-nourished.   HENT:   Head: Normocephalic and atraumatic.   Right Ear: External ear normal.   Left Ear: External ear normal.   Eyes: Conjunctivae and  EOM are normal. Pupils are equal, round, and reactive to light.   Neck: Neck supple.   Normal range of motion.  Cardiovascular:  Normal rate, regular rhythm, normal heart sounds and intact distal pulses.           Pulmonary/Chest: Breath sounds normal.   Abdominal: Abdomen is soft. Bowel sounds are normal. There is generalized abdominal tenderness.   obese There is no rebound and no guarding.   Musculoskeletal:         General: Normal range of motion.      Cervical back: Normal range of motion and neck supple.     Neurological: She is alert and oriented to person, place, and time. GCS score is 15. GCS eye subscore is 4. GCS verbal subscore is 5. GCS motor subscore is 6.   Skin: Skin is warm and dry. Capillary refill takes less than 2 seconds.   Psychiatric: She has a normal mood and affect. Her behavior is normal. Judgment and thought content normal.         ED Course   Procedures  Labs Reviewed   COMPREHENSIVE METABOLIC PANEL - Abnormal       Result Value    Sodium 142      Potassium 4.1      Chloride 112 (*)     CO2 23      Glucose 88      Blood Urea Nitrogen 9.5 (*)     Creatinine 0.71      Calcium 10.5 (*)     Protein Total 8.4 (*)     Albumin 4.5      Globulin 3.9 (*)     Albumin/Globulin Ratio 1.2      Bilirubin Total 0.4      ALP 89      ALT 28      AST 23      eGFR >60      Anion Gap 7.0      BUN/Creatinine Ratio 13     URINALYSIS, REFLEX TO URINE CULTURE - Abnormal    Color, UA Straw      Appearance, UA Clear      Specific Gravity, UA >=1.030      pH, UA 6.0      Protein,  (*)     Glucose, UA Negative      Ketones, UA Negative      Blood, UA Trace (*)     Bilirubin, UA Negative      Urobilinogen, UA 0.2      Nitrites, UA Negative      Leukocyte Esterase, UA Negative     CBC WITH DIFFERENTIAL - Abnormal    WBC 8.07      RBC 5.51 (*)     Hgb 16.2 (*)     Hct 49.4 (*)     MCV 89.7      MCH 29.4      MCHC 32.8 (*)     RDW 13.7      Platelet 193      MPV 12.2 (*)     Neut % 64.0      Lymph % 26.3      Mono  % 8.1      Eos % 0.9      Basophil % 0.2      Imm Grans % 0.5      Neut # 5.17      Lymph # 2.12      Mono # 0.65      Eos # 0.07      Baso # 0.02      Imm Gran # 0.04      NRBC% 0.0     URINALYSIS, MICROSCOPIC - Abnormal    Bacteria, UA Rare      Mucous, UA Many (*)     RBC, UA 0-2      WBC, UA 3-5      Squamous Epithelial Cells, UA Moderate (*)     Narrative:     Urine microscopic results may be inaccurate, <12 cc sample submitted   LIPASE - Normal    Lipase Level 38     TROPONIN I - Normal    Troponin-I <0.010     CBC W/ AUTO DIFFERENTIAL    Narrative:     The following orders were created for panel order CBC auto differential.  Procedure                               Abnormality         Status                     ---------                               -----------         ------                     CBC with Differential[[id number]]       Abnormal            Final result                 Please view results for these tests on the individual orders.          Imaging Results              CT Abdomen Pelvis With IV Contrast NO Oral Contrast (Final result)  Result time 06/25/25 12:55:29      Final result by Bill Ng MD (06/25/25 12:55:29)                   Impression:      No acute abdominopelvic findings.      Electronically signed by: Bill Ng  Date:    06/25/2025  Time:    12:55               Narrative:    EXAMINATION:  CT ABDOMEN PELVIS WITH IV CONTRAST    CLINICAL HISTORY:  Abdominal pain, acute, nonlocalized;.    TECHNIQUE:  Helical acquisition through the abdomen and pelvis with IV contrast.  Three plane reconstructions were provided for review.  mGycm. Automatic exposure control, adjustment of mA/kV or iterative reconstruction technique was used to reduce radiation.    COMPARISON:  29 July 2018    FINDINGS:  There is mild bibasilar atelectasis or scarring.    No significant abnormality of the liver, gallbladder, spleen, pancreas or adrenals.  There is no hydronephrosis.  Symmetric  nephrograms.    No bowel obstruction. No significant inflammatory changes of the bowel.  Normal appendix.  No free air.    Urinary bladder not well distended.  No pelvic free fluid.  Uterus surgically absent.  No adnexal mass.  The abdominal aorta is normal caliber.  There is mild atherosclerotic disease.    There are no acute osseous findings.                                       Medications   labetaloL injection 20 mg (has no administration in time range)   lactated ringers bolus 1,000 mL (1,000 mLs Intravenous New Bag 6/25/25 1231)   ketorolac injection 30 mg (30 mg Intravenous Given 6/25/25 1224)   iohexoL (OMNIPAQUE 350) injection 100 mL (100 mLs Intravenous Given 6/25/25 1245)     Medical Decision Making  Amount and/or Complexity of Data Reviewed  Labs: ordered. Decision-making details documented in ED Course.  Radiology: ordered. Decision-making details documented in ED Course.    Risk  Prescription drug management.    Differential includes:  gastritis, PUD, pancreatitis, GERD, angina, GB disease, hepatitis, enteritis, viral gastroenteritis, food poisoning, appendicitis, colitis, diverticulitis, IBS, IBD, and UTI  Will obtain CBC, CMP, lipase, UA, troponin and CT abdomen/pelvis.             ED Course as of 06/25/25 1402   Wed Jun 25, 2025   1357 Still with mild pain.  Discussed results - does not appear to be a serious cause of her abdominal pain.  She also mentions having a rash beneath both breasts.  Exam reveals what appears to be a fungal rash beneath both breasts.  Will D/C with topical antifungal as well as PPI and dicyclomine for abdominal symptoms. [CL]      ED Course User Index  [CL] Panda Smith MD                           Clinical Impression:  Final diagnoses:  [R10.84] Diffuse abdominal pain (Primary)  [R11.0] Nausea  [B37.2] Cutaneous candidiasis          ED Disposition Condition    Discharge Stable          ED Prescriptions       Medication Sig Dispense Start Date End Date Auth.  Provider    pantoprazole (PROTONIX) 40 MG tablet Take 1 tablet (40 mg total) by mouth once daily. 30 tablet 6/25/2025 9/23/2025 Panda Smith MD    dicyclomine (BENTYL) 20 mg tablet Take 1 tablet (20 mg total) by mouth 4 (four) times daily. for 10 days 40 tablet 6/25/2025 7/5/2025 Panda Smith MD    nystatin (MYCOSTATIN) powder Apply topically 2 (two) times daily. 60 g 6/25/2025 -- Panda Smith MD          Follow-up Information       Follow up With Specialties Details Why Contact Info    Eliza Ortega MD Internal Medicine Schedule an appointment as soon as possible for a visit   [address] [place], [place] [address]  [facility] [id number] [phone]                     [1]   Social History  Tobacco Use    Smoking status: Never    Smokeless tobacco: Never   Substance Use Topics    Alcohol use: Never    Drug use: Never        Panda Smith MD  06/25/25 [id number]